# Patient Record
Sex: FEMALE
[De-identification: names, ages, dates, MRNs, and addresses within clinical notes are randomized per-mention and may not be internally consistent; named-entity substitution may affect disease eponyms.]

---

## 2018-12-30 ENCOUNTER — HOSPITAL ENCOUNTER (EMERGENCY)
Dept: HOSPITAL 42 - ED | Age: 48
Discharge: HOME | End: 2018-12-30
Payer: COMMERCIAL

## 2018-12-30 VITALS
DIASTOLIC BLOOD PRESSURE: 78 MMHG | SYSTOLIC BLOOD PRESSURE: 131 MMHG | TEMPERATURE: 98.1 F | OXYGEN SATURATION: 99 % | HEART RATE: 78 BPM

## 2018-12-30 VITALS — BODY MASS INDEX: 25 KG/M2

## 2018-12-30 VITALS — RESPIRATION RATE: 18 BRPM

## 2018-12-30 DIAGNOSIS — Z82.49: ICD-10-CM

## 2018-12-30 DIAGNOSIS — R07.9: Primary | ICD-10-CM

## 2018-12-30 DIAGNOSIS — Z83.3: ICD-10-CM

## 2018-12-30 DIAGNOSIS — I10: ICD-10-CM

## 2018-12-30 LAB
ALBUMIN SERPL-MCNC: 4.7 G/DL (ref 3–4.8)
ALBUMIN/GLOB SERPL: 1.3 {RATIO} (ref 1.1–1.8)
ALT SERPL-CCNC: 28 U/L (ref 7–56)
AST SERPL-CCNC: 34 U/L (ref 14–36)
BASOPHILS # BLD AUTO: 0.04 K/MM3 (ref 0–2)
BASOPHILS NFR BLD: 0.5 % (ref 0–3)
BUN SERPL-MCNC: 12 MG/DL (ref 7–21)
CALCIUM SERPL-MCNC: 9.4 MG/DL (ref 8.4–10.5)
EOSINOPHIL # BLD: 0.1 10*3/UL (ref 0–0.7)
EOSINOPHIL NFR BLD: 0.6 % (ref 1.5–5)
ERYTHROCYTE [DISTWIDTH] IN BLOOD BY AUTOMATED COUNT: 13.5 % (ref 11.5–14.5)
GFR NON-AFRICAN AMERICAN: > 60
GRANULOCYTES # BLD: 5.54 10*3/UL (ref 1.4–6.5)
GRANULOCYTES NFR BLD: 62.8 % (ref 50–68)
HGB BLD-MCNC: 14 G/DL (ref 12–16)
LYMPHOCYTES # BLD: 2.8 10*3/UL (ref 1.2–3.4)
LYMPHOCYTES NFR BLD AUTO: 31.4 % (ref 22–35)
MCH RBC QN AUTO: 30 PG (ref 25–35)
MCHC RBC AUTO-ENTMCNC: 32.9 G/DL (ref 31–37)
MCV RBC AUTO: 91 FL (ref 80–105)
MONOCYTES # BLD AUTO: 0.4 10*3/UL (ref 0.1–0.6)
MONOCYTES NFR BLD: 4.7 % (ref 1–6)
PLATELET # BLD: 308 10^3/UL (ref 120–450)
PMV BLD AUTO: 9.4 FL (ref 7–11)
RBC # BLD AUTO: 4.67 10^6/UL (ref 3.5–6.1)
TROPONIN I SERPL-MCNC: < 0.01 NG/ML
WBC # BLD AUTO: 8.8 10^3/UL (ref 4.5–11)

## 2018-12-30 NOTE — ED PDOC
Arrival/HPI





- General


Chief Complaint: Chest Pain


Time Seen by Provider: 18 12:52


Historian: Patient





- History of Present Illness


Narrative History of Present Illness (Text): 


18 13:42


A 48 year old female, whose past medical history includes hypertension, presents

to the emergency department complaining of chest pain and body heat. Patient 

reports her brother passed away in 10/2018 for unknown cause. Notes she has f

amily history of hypertension and diabetes. PTA, patient was at home, cooking 

food for her kids, when she felt a sudden rush of heat throughout her body. 

Patient became concerned, checked her blood pressure that showed 149/89, to 

which she thought was high. She became very concerned and decided to come to the

ER to be evaluated. Patient denies any headache, neck pain, fever, chills, 

vomiting, diarrhea, abdominal pain, recent travel, dizziness, syncope or any 

other complaints at this time. Patient states her brother recently  at age 

59 of unknown causes.





PMD: Dr. Silva














Past Medical History





- Provider Review


Nursing Documentation Reviewed: Yes





- Tetanus Immunization


Tetanus Immunization: Unknown





- Past Medical History


Past Medical History: No Previous





- Psychiatric


Hx Depression: No


Hx Emotional Abuse: No


Hx Physical Abuse: No


Hx Substance Use: No





- Past Surgical History


Past Surgical History: No Previous





- Suicidal Assessment


Feels Threatened In Home Enviroment: No





Family/Social History





- Physician Review


Nursing Documentation Reviewed: Yes


Family/Social History: No Known Family HX


Smoking Status: Never Smoked


Hx Alcohol Use: Yes


Frequency of alcohol use: Socially


Hx Substance Use: No


Hx Substance Use Treatment: No





Allergies/Home Meds


Allergies/Adverse Reactions: 


Allergies





No Known Allergies Allergy (Verified 18 12:51)


   








Home Medications: 


                                    Home Meds











 Medication  Instructions  Recorded  Confirmed


 


RX: No Known Home Med  18














Review of Systems





- Physician Review


All systems were reviewed & negative as marked: Yes





- Review of Systems


Constitutional: Other (felt "sudden rush of heat" throughout body.).  absent: 

Fevers, Night Sweats


Respiratory: absent: SOB


Cardiovascular: Chest Pain


Gastrointestinal: absent: Abdominal Pain, Diarrhea, Nausea, Vomiting





Physical Exam





- Physical Exam


Narrative Physical Exam (Text): 





Gen: NAD, cooperative, well appearing, non-toxic.


Head: NCAT.


HEENT: 


EYES: PERRL, EOMI, conjunctiva clear, 


EARS: TMs clear


MOUTH: moist MM, posterior pharynx without erythema or exudate, uvula midline.


CV: (+) S1S2, RRR, no M/G/R


LUNGS: CTA B/L, No W/R/R, good air movement


Abd: Soft, NTTP, no guarding, rebound or rigidity.


Neuro: AAO x 3, GCS 15, CN 2-12 intact, motor and sensory grossly intact, 5/5 

muscle strength B/L UE's and LE's.


ext: no cyanosis or edema





Vital Signs Reviewed: Yes





Vital Signs











  Temp Pulse Resp BP Pulse Ox


 


 18 12:49  98.2 F  77  18  135/93 H  100











Temperature: Afebrile


Blood Pressure: Normal


Pulse: Regular


Respiratory Rate: Normal


Appearance: Positive for: Well-Appearing, Non-Toxic, Comfortable


Pain Distress: None


Mental Status: Positive for: Alert and Oriented X 3





Medical Decision Making


ED Course and Treatment: 


18 13:54


Impression: 48 year old female with chest pain and body heat.





Plan:


-- EKG


-- Chest X-ray


-- Labs


-- Aspirin


-- Urinalysis 


-- Reassess and disposition





Progress Notes:


EKG: Ordered, reviewed, and independently interpreted the EKG.


Rate : 77 BPM


Rhythm : NSR


Interpretation : No ST-segment elevations or depressions, no T-wave inversions, 

normal intervals.


Comparison : No previous EKG for comparison.





2018 14:37


Chest X-ray


IMPRESSION: No active disease.


Dictator: Teto Mcmullen MD





Trop neg x2, remainder of labs and cxr unremarkable.  Heart Score: 2 (low 

score).  Results discussed with patient.  Patient stable for d/c home to follow 

up w/her pcp for further evaluation and management.  She received verbal and 

written d/c instructions to follow up and RTED for new or concerning symptoms.  

Patient given the opportunity to ask questions.    








- Lab Interpretations


I have reviewed the lab results: Yes





- RAD Interpretation


Radiology Orders: 











18 13:19


CHEST TWO VIEWS (PA/LAT) [RAD] Stat 














- Medication Orders


Current Medication Orders: 














Discontinued Medications





Aspirin (Aspirin)  325 mg PO STAT STA


   Stop: 18 13:20











- Scribe Statement


The provider has reviewed the documentation as recorded by the Olga Riggins





Provider Scribe Attestation:


All medical record entries made by the Duniaibrosanne were at my direction and 

personally dictated by me. I have reviewed the chart and agree that the record 

accurately reflects my personal performance of the history, physical exam, 

medical decision making, and the department course for this patient. I have also

 personally directed, reviewed, and agree with the discharge instructions and 

disposition.








Disposition/Present on Arrival





- Present on Arrival


Any Indicators Present on Arrival: No


History of DVT/PE: No


History of Uncontrolled Diabetes: No


Urinary Catheter: No


History of Decub. Ulcer: No


History Surgical Site Infection Following: None





- Disposition


Have Diagnosis and Disposition been Completed?: Yes


Diagnosis: 


 Chest pain





Disposition: HOME/ ROUTINE


Disposition Time: 16:59


Patient Plan: Discharge


Condition: STABLE


Discharge Instructions (ExitCare):  Chest Pain (DC)


Additional Instructions: 





DREW M ESSEX, thank you for letting us take care of you today. Your provider was

 Sindy Sullivan MD and you were treated for CHEST PAIN. The emergency medical 

care you received today was directed at your acute symptoms. If you were 

prescribed any medication, please fill it and take as directed. It may take 

several days for your symptoms to resolve. Return to the Emergency Department if

 your symptoms worsen, do not improve, or if you have any other problems.





Please contact your doctor in 1-2 days for follow up appointment for further 

evaluation. Bring any paperwork you were given at discharge with you along with 

any medications you are taking to your follow up visit. Our treatment cannot 

replace ongoing medical care by a primary care provider outside of the emergency

 department.





Thank you for allowing the [facility] team to be part of your care today.








Referrals: 


Baylee Silva DO [Primary Care Provider] - Follow up with primary


Forms:  [employer] (English)

## 2018-12-30 NOTE — RAD
Date of service: 



12/30/2018



HISTORY:

 cp 



COMPARISON:

No prior.



TECHNIQUE:

Chest PA and lateral



FINDINGS:



LUNGS:

No active pulmonary disease.



PLEURA:

No significant pleural effusion identified. No pneumothorax apparent.



CARDIOVASCULAR:

No aortic atherosclerotic calcification present.



Normal cardiac size. No pulmonary vascular congestion. 



OSSEOUS STRUCTURES:

No significant abnormalities.



VISUALIZED UPPER ABDOMEN:

Normal.



OTHER FINDINGS:

None.



IMPRESSION:

No active disease.

## 2018-12-30 NOTE — CARD
--------------- APPROVED REPORT --------------





Date of service: 12/30/2018



EKG Measurement

Heart [id number]

[id number]

[id number]

[id number]

[id number]



<Conclusion>

Normal sinus rhythm

Normal ECG